# Patient Record
Sex: FEMALE | Race: WHITE | NOT HISPANIC OR LATINO | URBAN - METROPOLITAN AREA
[De-identification: names, ages, dates, MRNs, and addresses within clinical notes are randomized per-mention and may not be internally consistent; named-entity substitution may affect disease eponyms.]

---

## 2017-03-29 ENCOUNTER — ALLSCRIPTS OFFICE VISIT (OUTPATIENT)
Dept: OTHER | Facility: OTHER | Age: 47
End: 2017-03-29

## 2017-03-29 ENCOUNTER — TRANSCRIBE ORDERS (OUTPATIENT)
Dept: ADMINISTRATIVE | Age: 47
End: 2017-03-29

## 2017-03-29 ENCOUNTER — APPOINTMENT (OUTPATIENT)
Dept: LAB | Age: 47
End: 2017-03-29
Payer: COMMERCIAL

## 2017-03-29 DIAGNOSIS — R53.83 OTHER MALAISE AND FATIGUE: ICD-10-CM

## 2017-03-29 DIAGNOSIS — L50.9 URTICARIA: ICD-10-CM

## 2017-03-29 DIAGNOSIS — L50.9 URTICARIA, UNSPECIFIED: Primary | ICD-10-CM

## 2017-03-29 DIAGNOSIS — L50.9 URTICARIA, UNSPECIFIED: ICD-10-CM

## 2017-03-29 DIAGNOSIS — R53.83 OTHER FATIGUE: ICD-10-CM

## 2017-03-29 DIAGNOSIS — J45.909 UNCOMPLICATED ASTHMA: ICD-10-CM

## 2017-03-29 DIAGNOSIS — R53.81 OTHER MALAISE AND FATIGUE: ICD-10-CM

## 2017-03-29 LAB
ALBUMIN SERPL BCP-MCNC: 3.7 G/DL (ref 3.5–5)
ALP SERPL-CCNC: 95 U/L (ref 46–116)
ALT SERPL W P-5'-P-CCNC: 28 U/L (ref 12–78)
ANION GAP SERPL CALCULATED.3IONS-SCNC: 8 MMOL/L (ref 4–13)
AST SERPL W P-5'-P-CCNC: 20 U/L (ref 5–45)
BASOPHILS # BLD AUTO: 0.04 THOUSANDS/ΜL (ref 0–0.1)
BASOPHILS NFR BLD AUTO: 1 % (ref 0–1)
BILIRUB SERPL-MCNC: 0.45 MG/DL (ref 0.2–1)
BUN SERPL-MCNC: 18 MG/DL (ref 5–25)
CALCIUM SERPL-MCNC: 8.7 MG/DL (ref 8.3–10.1)
CHLORIDE SERPL-SCNC: 108 MMOL/L (ref 100–108)
CO2 SERPL-SCNC: 27 MMOL/L (ref 21–32)
CREAT SERPL-MCNC: 0.96 MG/DL (ref 0.6–1.3)
CRP SERPL QL: <3 MG/L
EOSINOPHIL # BLD AUTO: 0.57 THOUSAND/ΜL (ref 0–0.61)
EOSINOPHIL NFR BLD AUTO: 9 % (ref 0–6)
ERYTHROCYTE [DISTWIDTH] IN BLOOD BY AUTOMATED COUNT: 13.2 % (ref 11.6–15.1)
ERYTHROCYTE [SEDIMENTATION RATE] IN BLOOD: 7 MM/HOUR (ref 0–20)
GFR SERPL CREATININE-BSD FRML MDRD: >60 ML/MIN/1.73SQ M
GLUCOSE SERPL-MCNC: 102 MG/DL (ref 65–140)
HCT VFR BLD AUTO: 42.9 % (ref 34.8–46.1)
HGB BLD-MCNC: 14.6 G/DL (ref 11.5–15.4)
LYMPHOCYTES # BLD AUTO: 1.68 THOUSANDS/ΜL (ref 0.6–4.47)
LYMPHOCYTES NFR BLD AUTO: 26 % (ref 14–44)
MCH RBC QN AUTO: 28.8 PG (ref 26.8–34.3)
MCHC RBC AUTO-ENTMCNC: 34 G/DL (ref 31.4–37.4)
MCV RBC AUTO: 85 FL (ref 82–98)
MONOCYTES # BLD AUTO: 0.44 THOUSAND/ΜL (ref 0.17–1.22)
MONOCYTES NFR BLD AUTO: 7 % (ref 4–12)
NEUTROPHILS # BLD AUTO: 3.75 THOUSANDS/ΜL (ref 1.85–7.62)
NEUTS SEG NFR BLD AUTO: 57 % (ref 43–75)
NRBC BLD AUTO-RTO: 0 /100 WBCS
PLATELET # BLD AUTO: 264 THOUSANDS/UL (ref 149–390)
PMV BLD AUTO: 11.1 FL (ref 8.9–12.7)
POTASSIUM SERPL-SCNC: 3.8 MMOL/L (ref 3.5–5.3)
PROT SERPL-MCNC: 7.5 G/DL (ref 6.4–8.2)
RBC # BLD AUTO: 5.07 MILLION/UL (ref 3.81–5.12)
SODIUM SERPL-SCNC: 143 MMOL/L (ref 136–145)
TSH SERPL DL<=0.05 MIU/L-ACNC: 1.47 UIU/ML (ref 0.36–3.74)
WBC # BLD AUTO: 6.49 THOUSAND/UL (ref 4.31–10.16)

## 2017-03-29 PROCEDURE — 84443 ASSAY THYROID STIM HORMONE: CPT

## 2017-03-29 PROCEDURE — 86003 ALLG SPEC IGE CRUDE XTRC EA: CPT

## 2017-03-29 PROCEDURE — 86430 RHEUMATOID FACTOR TEST QUAL: CPT

## 2017-03-29 PROCEDURE — 86038 ANTINUCLEAR ANTIBODIES: CPT

## 2017-03-29 PROCEDURE — 86200 CCP ANTIBODY: CPT

## 2017-03-29 PROCEDURE — 85025 COMPLETE CBC W/AUTO DIFF WBC: CPT

## 2017-03-29 PROCEDURE — 85652 RBC SED RATE AUTOMATED: CPT

## 2017-03-29 PROCEDURE — 36415 COLL VENOUS BLD VENIPUNCTURE: CPT

## 2017-03-29 PROCEDURE — 80053 COMPREHEN METABOLIC PANEL: CPT

## 2017-03-29 PROCEDURE — 86140 C-REACTIVE PROTEIN: CPT

## 2017-03-29 PROCEDURE — 82785 ASSAY OF IGE: CPT

## 2017-03-30 LAB
A ALTERNATA IGE QN: 0.16 KUA/I
A FUMIGATUS IGE QN: <0.1 KUA/I
ALLERGEN COMMENT: ABNORMAL
BERMUDA GRASS IGE QN: 0.2 KUA/I
BOXELDER IGE QN: 0.72 KUA/I
C HERBARUM IGE QN: 0.11 KUA/I
CAT DANDER IGE QN: 15.4 KUA/I
CMN PIGWEED IGE QN: <0.1 KUA/I
COMMON RAGWEED IGE QN: 0.51 KUA/I
COTTONWOOD IGE QN: 0.21 KUA/I
D FARINAE IGE QN: 3.53 KUA/I
D PTERONYSS IGE QN: 4.2 KUA/I
DOG DANDER IGE QN: >100 KUA/I
LONDON PLANE IGE QN: 0.31 KUA/I
MOUSE URINE PROT IGE QN: 1.74 KUA/I
MT JUNIPER IGE QN: 0.15 KUA/I
MUGWORT IGE QN: 0.74 KUA/I
P NOTATUM IGE QN: <0.1 KUA/I
RHEUMATOID FACT SER QL LA: NEGATIVE
ROACH IGE QN: 0.45 KUA/I
SHEEP SORREL IGE QN: 0.26 KUA/I
SILVER BIRCH IGE QN: 0.29 KUA/I
TIMOTHY IGE QN: 2.78 KUA/I
TOTAL IGE SMQN RAST: 2095 KU/L (ref 0–113)
WALNUT IGE QN: 4.27 KUA/I
WHITE ASH IGE QN: 0.28 KUA/I
WHITE ELM IGE QN: 1.48 KUA/I
WHITE MULBERRY IGE QN: 0.11 KUA/I
WHITE OAK IGE QN: 0.12 KUA/I

## 2017-03-31 LAB — RYE IGE QN: NEGATIVE

## 2017-04-01 LAB — CCP IGA+IGG SERPL IA-ACNC: 16 UNITS (ref 0–19)

## 2017-04-17 ENCOUNTER — GENERIC CONVERSION - ENCOUNTER (OUTPATIENT)
Dept: OTHER | Facility: OTHER | Age: 47
End: 2017-04-17

## 2017-05-10 ENCOUNTER — GENERIC CONVERSION - ENCOUNTER (OUTPATIENT)
Dept: OTHER | Facility: OTHER | Age: 47
End: 2017-05-10

## 2017-06-20 ENCOUNTER — GENERIC CONVERSION - ENCOUNTER (OUTPATIENT)
Dept: OTHER | Facility: OTHER | Age: 47
End: 2017-06-20

## 2018-01-13 VITALS
OXYGEN SATURATION: 98 % | WEIGHT: 170 LBS | HEIGHT: 68 IN | SYSTOLIC BLOOD PRESSURE: 108 MMHG | RESPIRATION RATE: 16 BRPM | DIASTOLIC BLOOD PRESSURE: 74 MMHG | HEART RATE: 84 BPM | TEMPERATURE: 95.5 F | BODY MASS INDEX: 25.76 KG/M2

## 2019-02-05 ENCOUNTER — TELEPHONE (OUTPATIENT)
Dept: INTERNAL MEDICINE CLINIC | Facility: CLINIC | Age: 49
End: 2019-02-05

## 2019-02-05 NOTE — TELEPHONE ENCOUNTER
Pt is due for routine follow-up  Last seen 03/2017  Due for labs  She is followed by allergist for asthma, but due for routine labs, screenings    She may schedule a physical if she has no concerns  THANKS!!